# Patient Record
Sex: MALE | Race: WHITE | URBAN - METROPOLITAN AREA
[De-identification: names, ages, dates, MRNs, and addresses within clinical notes are randomized per-mention and may not be internally consistent; named-entity substitution may affect disease eponyms.]

---

## 2021-08-11 ENCOUNTER — HOSPITAL ENCOUNTER (OUTPATIENT)
Dept: RADIATION THERAPY | Age: 61
Discharge: HOME | End: 2021-08-11

## 2021-08-11 VITALS — HEIGHT: 70 IN | BODY MASS INDEX: 27.63 KG/M2 | WEIGHT: 193 LBS

## 2021-08-11 RX ORDER — VALSARTAN AND HYDROCHLOROTHIAZIDE 160; 12.5 MG/1; MG/1
1 TABLET, FILM COATED ORAL DAILY
COMMUNITY

## 2023-05-15 RX ORDER — VALSARTAN AND HYDROCHLOROTHIAZIDE 160; 12.5 MG/1; MG/1
1 TABLET, FILM COATED ORAL DAILY
COMMUNITY

## 2024-06-18 ENCOUNTER — HOSPITAL ENCOUNTER (OUTPATIENT)
Facility: HOSPITAL | Age: 64
Setting detail: RECURRING SERIES
Discharge: HOME | End: 2024-06-21
Payer: COMMERCIAL

## 2024-06-18 PROCEDURE — 97161 PT EVAL LOW COMPLEX 20 MIN: CPT

## 2024-06-18 PROCEDURE — 97110 THERAPEUTIC EXERCISES: CPT

## 2024-06-18 NOTE — THERAPY EVALUATION
while the patient is under my care. I agree with the treatment plan and certify that this therapy is necessary.    Physician's Signature:_________________________   DATE:_________   TIME:________                           Lencho Lane MD    ** Signature, Date and Time must be completed for valid certification **  Please sign and fax to [phone].  Thank you      Patient Name:  Joce Wen :  1960

## 2024-06-21 ENCOUNTER — HOSPITAL ENCOUNTER (OUTPATIENT)
Facility: HOSPITAL | Age: 64
Setting detail: RECURRING SERIES
End: 2024-06-21
Payer: COMMERCIAL

## 2024-06-24 ENCOUNTER — HOSPITAL ENCOUNTER (OUTPATIENT)
Facility: HOSPITAL | Age: 64
Setting detail: RECURRING SERIES
Discharge: HOME | End: 2024-06-27
Payer: COMMERCIAL

## 2024-06-24 PROCEDURE — 97110 THERAPEUTIC EXERCISES: CPT

## 2024-06-24 PROCEDURE — 97016 VASOPNEUMATIC DEVICE THERAPY: CPT

## 2024-06-26 ENCOUNTER — HOSPITAL ENCOUNTER (OUTPATIENT)
Facility: HOSPITAL | Age: 64
Setting detail: RECURRING SERIES
Discharge: HOME | End: 2024-06-29
Payer: COMMERCIAL

## 2024-06-26 PROCEDURE — 97016 VASOPNEUMATIC DEVICE THERAPY: CPT

## 2024-06-26 PROCEDURE — 97110 THERAPEUTIC EXERCISES: CPT

## 2024-06-26 NOTE — PROGRESS NOTES
PHYSICAL THERAPY - DAILY TREATMENT NOTE (updated 3/23)    Date of Service: 2024        Patient Name:  Joce Wen :  1960   Medical   Diagnosis:  Primary osteoarthritis, right shoulder [M19.011] Treatment Diagnosis:  M25.511  RIGHT SHOULDER PAIN and M79.621  Pain in right upper arm    Referral Source:  Lencho Lane MD Insurance:   Payor: [facility] / Plan: [facility] / Product Type: *No Product type* /     [x] Patient's date of birth verified                Visit #   Current  / Total 2 10   Time   In / Out 1119 1207   Total Treatment Time (in minutes) 48    Total Timed Codes  (in minutes) 38    [facility] Totals Reminder:    8-22 min = 1 unit; 23-37 min = 2 units; 38-52 min = 3 units;  53-67 min = 4 units; 68-82 min = 5 units    SUBJECTIVE  Pain Level (0-10 scale): 0/10    Any medication changes, allergies to medications, adverse drug reactions, diagnosis change, or new procedure performed?: No  Medications: [x]  Verified on Patient Summary List    Subjective functional status/changes:     \"It's sore from my last visit.\"    OBJECTIVE  Therapeutic Procedures:  Tx Min Billable or 1:1 Min (if diff from Tx Min) Procedure, Rationale, Specifics   38  [id number] Therapeutic Exercise (timed):  increase ROM, strength, coordination, balance, and proprioception to improve patient's ability to progress to PLOF and address remaining functional goals. (see flow sheet as applicable)   38     Total Total   [x] Patient Education billed concurrently with other procedures    Modalities Rationale:     decrease edema, decrease inflammation, and decrease pain to improve patient's ability to progress to PLOF and address remaining functional goals.       min [] Estim Unattended,             []  NMES  [] PreMod       []  IFC  [] Other:  []w/US   []w/ice   []w/heat  Position:  Location:            min []  Mechanical Traction,        []  Cervical      []  Lumbar        []  Prone         []  Supine           []  Intermitt.     []  Cont.

## 2024-07-01 ENCOUNTER — HOSPITAL ENCOUNTER (OUTPATIENT)
Facility: HOSPITAL | Age: 64
Setting detail: RECURRING SERIES
Discharge: HOME | End: 2024-07-04
Payer: COMMERCIAL

## 2024-07-01 PROCEDURE — 97016 VASOPNEUMATIC DEVICE THERAPY: CPT

## 2024-07-01 PROCEDURE — 97110 THERAPEUTIC EXERCISES: CPT

## 2024-07-01 NOTE — PROGRESS NOTES
PHYSICAL THERAPY - DAILY TREATMENT NOTE (updated 3/23)    Date of Service: 2024        Patient Name:  Joce Wen :  1960   Medical   Diagnosis:  Primary osteoarthritis, right shoulder [M19.011] Treatment Diagnosis:  M25.511  RIGHT SHOULDER PAIN    Referral Source:  Lencho Lane MD Insurance:   Payor: [facility] / Plan: [facility] / Product Type: *No Product type* /     [x] Patient's date of birth verified                Visit #   Current  / Total 4 10   Time   In / Out 1518 1612   Total Treatment Time (in minutes) 54    Total Timed Codes  (in minutes) 44    [facility] Totals Reminder:    8-22 min = 1 unit; 23-37 min = 2 units; 38-52 min = 3 units;  53-67 min = 4 units; 68-82 min = 5 units      SUBJECTIVE  Pain Level (0-10 scale): 0/10-    Any medication changes, allergies to medications, adverse drug reactions, diagnosis change, or new procedure performed?: No  Medications: [x]  Verified on Patient Summary List    Subjective functional status/changes:     \"Pt reports he had some soreness in the shoulder last night and it hurt for a day or so after last visit, better today.\"    OBJECTIVE  Therapeutic Procedures:  Tx Min Billable or 1:1 Min (if diff from Tx Min) Procedure, Rationale, Specifics   44 44 [id number] Therapeutic Exercise (timed):  increase ROM, strength, coordination, balance, and proprioception to improve patient's ability to progress to PLOF and address remaining functional goals. (see flow sheet as applicable)   44 44    Total Total   [x] Patient Education billed concurrently with other procedures    Modalities Rationale:     decrease edema, decrease inflammation, decrease pain, and increase tissue extensibility to improve patient's ability to progress to PLOF and address remaining functional goals.       min [] Estim Unattended,             []  NMES  [] PreMod       []  IFC  [] Other:  []w/US   []w/ice   []w/heat  Position:  Location:            min []  Mechanical Traction,        []  Cervical

## 2024-07-03 ENCOUNTER — APPOINTMENT (OUTPATIENT)
Facility: HOSPITAL | Age: 64
End: 2024-07-03
Payer: COMMERCIAL

## 2024-07-05 ENCOUNTER — HOSPITAL ENCOUNTER (OUTPATIENT)
Facility: HOSPITAL | Age: 64
Setting detail: RECURRING SERIES
Discharge: HOME | End: 2024-07-08
Payer: COMMERCIAL

## 2024-07-05 PROCEDURE — 97016 VASOPNEUMATIC DEVICE THERAPY: CPT

## 2024-07-05 PROCEDURE — 97110 THERAPEUTIC EXERCISES: CPT

## 2024-07-05 NOTE — PROGRESS NOTES
Elbow/wrist arom      X 20  Bicep curls  x20     (3 way)   Bicep curls  X2/10   (3 way)   Bicep curls  X2/10                 Scap retract            x20    X 20 / 5\" hold  X 20 / 5\" hold                pulleys  flex            x3'    X 2 '  X 3 '                  PROM      supine  X 10 '    x8'  X 10'  X 10'                                                                                                         Modalities to be included future treatment sessions: Vasopneumatic Compression and Electrical Stimulation  Has HEP been provided to patient? [] No    [x] Yes        Access Code: [email or web address]      Date Provided: 6/18/2024  [] Reviewed HEP    [] Progressed/Changed HEP, details:      GOALS  Short Term Goals, to be met within 5 treatments:  Patient will demonstrate independence and compliance with HEP in order to increase mobility and assist with carryover from PT services.  Status at last Eval/Progress Note: provided  Current Status: provided  Goal Met?  No     2.  Patient will be able to reach overhead into cabinet without pain greater than or equal to 3/10 to increase functional mobility.  Status at last Eval/Progress Note: unable  Current Status: unable  Goal Met?  No     3. Patient will increase right shoulder arom flexion to 110 degrees in order to improve function.  Status at last Eval/Progress Note: 95 degrees  Current Status: 95 degrees  Goal Met?  No     Long Term Goals, to be met within 10 treatments:  1.Patient will be able to return to local gym without difficulty to increase functional mobility.  Status at last Eval/Progress Note: unable  Current Status: unable  Goal Met?  No     2.  Patient will increase right shoulder abduction arom to 115 degrees in order to improve function.  Status at last Eval/Progress Note: 86 degrees  Current Status: 86 degrees  Goal Met?  No     3. Patient will be able to complete all ADLs without pain greater than or equal to 1/10 to increase functional mobility.  Status at

## 2024-07-08 ENCOUNTER — HOSPITAL ENCOUNTER (OUTPATIENT)
Facility: HOSPITAL | Age: 64
Setting detail: RECURRING SERIES
Discharge: HOME | End: 2024-07-11
Payer: COMMERCIAL

## 2024-07-08 PROCEDURE — 97140 MANUAL THERAPY 1/> REGIONS: CPT

## 2024-07-08 PROCEDURE — 97110 THERAPEUTIC EXERCISES: CPT

## 2024-07-08 PROCEDURE — 97016 VASOPNEUMATIC DEVICE THERAPY: CPT

## 2024-07-08 NOTE — PROGRESS NOTES
PHYSICAL THERAPY - DAILY TREATMENT NOTE (updated 3/23)    Date of Service: 2024        Patient Name:  Joce Wen :  1960   Medical   Diagnosis:  Primary osteoarthritis, right shoulder [M19.011] Treatment Diagnosis:  M25.511  RIGHT SHOULDER PAIN    Referral Source:  Lencho Lane MD Insurance:   Payor: [facility] / Plan: [facility] / Product Type: *No Product type* /     [x] Patient's date of birth verified                Visit #   Current  / Total 6 10   Time   In / Out 15:30 16;25   Total Treatment Time (in minutes) 55    Total Timed Codes  (in minutes) 45     [facility] Totals Reminder:    8-22 min = 1 unit; 23-37 min = 2 units; 38-52 min = 3 units;  53-67 min = 4 units; 68-82 min = 5 units      SUBJECTIVE  Pain Level (0-10 scale): 1/10    Any medication changes, allergies to medications, adverse drug reactions, diagnosis change, or new procedure performed?: No  Medications: [x]  Verified on Patient Summary List    Subjective functional status/changes:     \"I am a little sore in my forearm..\"    OBJECTIVE  Therapeutic Procedures:  Tx Min Billable or 1:1 Min (if diff from Tx Min) Procedure, Rationale, Specifics   33 33 44534 Therapeutic Exercise (timed):  increase ROM, strength, coordination, balance, and proprioception to improve patient's ability to progress to PLOF and address remaining functional goals. (see flow sheet as applicable)   12 12 64627 Manual Therapy (timed):  decrease pain, increase ROM, and increase tissue extensibility to improve patient's ability to progress to PLOF and address remaining functional goals.  The manual therapy interventions were performed at a separate and distinct time from the therapeutic activities interventions . (see flow sheet as applicable)             45 45    Total Total   [x] Patient Education billed concurrently with other procedures    Modalities Rationale:     decrease edema, decrease inflammation, and decrease pain to improve patient's ability to progress to

## 2024-07-10 ENCOUNTER — HOSPITAL ENCOUNTER (OUTPATIENT)
Facility: HOSPITAL | Age: 64
Setting detail: RECURRING SERIES
Discharge: HOME | End: 2024-07-13
Payer: COMMERCIAL

## 2024-07-10 PROCEDURE — 97016 VASOPNEUMATIC DEVICE THERAPY: CPT

## 2024-07-10 PROCEDURE — 97110 THERAPEUTIC EXERCISES: CPT

## 2024-07-10 PROCEDURE — 97140 MANUAL THERAPY 1/> REGIONS: CPT

## 2024-07-10 NOTE — PROGRESS NOTES
PHYSICAL THERAPY - DAILY TREATMENT NOTE (updated 3/23)    Date of Service: 7/10/2024        Patient Name:  Joce Wen :  1960   Medical   Diagnosis:  Primary osteoarthritis, right shoulder [M19.011] Treatment Diagnosis:  M25.511  RIGHT SHOULDER PAIN    Referral Source:  Lencho Lane MD Insurance:   Payor: [facility] / Plan: [facility] / Product Type: *No Product type* /     [x] Patient's date of birth verified                Visit #   Current  / Total 7 10   Time   In / Out 15:30 16:30   Total Treatment Time (in minutes) 60    Total Timed Codes  (in minutes) 50    [facility] Totals Reminder:    8-22 min = 1 unit; 23-37 min = 2 units; 38-52 min = 3 units;  53-67 min = 4 units; 68-82 min = 5 units      SUBJECTIVE  Pain Level (0-10 scale): 1/10    Any medication changes, allergies to medications, adverse drug reactions, diagnosis change, or new procedure performed?: No  Medications: [x]  Verified on Patient Summary List    Subjective functional status/changes:     \"I'm just a little sore. The forearm numbness is getting better and the thumb is starting to feel normal with very little tingling    OBJECTIVE  Therapeutic Procedures:  Tx Min Billable or 1:1 Min (if diff from Tx Min) Procedure, Rationale, Specifics   36 36 [id number] Therapeutic Exercise (timed):  increase ROM, strength, coordination, balance, and proprioception to improve patient's ability to progress to PLOF and address remaining functional goals. (see flow sheet as applicable)   14 14 36968 Manual Therapy (timed):  decrease pain, increase ROM, and increase tissue extensibility to improve patient's ability to progress to PLOF and address remaining functional goals.  The manual therapy interventions were performed at a separate and distinct time from the therapeutic activities interventions . (see flow sheet as applicable)             50 50    Total Total   [x] Patient Education billed concurrently with other procedures    Modalities Rationale:

## 2024-07-15 ENCOUNTER — HOSPITAL ENCOUNTER (OUTPATIENT)
Facility: HOSPITAL | Age: 64
Setting detail: RECURRING SERIES
Discharge: HOME | End: 2024-07-18
Payer: COMMERCIAL

## 2024-07-15 PROCEDURE — 97016 VASOPNEUMATIC DEVICE THERAPY: CPT

## 2024-07-15 PROCEDURE — 97110 THERAPEUTIC EXERCISES: CPT

## 2024-07-15 NOTE — PROGRESS NOTES
right shoulder abduction arom to 115 degrees in order to improve function.  Status at last Eval/Progress Note: 86 degrees  Current Status: 86 degrees  Goal Met?  No     3. Patient will be able to complete all ADLs without pain greater than or equal to 1/10 to increase functional mobility.  Status at last Eval/Progress Note: limited  Current Status: limited  Goal Met?  No     4. Patient will be able to return to all work duties without pain greater than or equal to 1/10 to increase functional mobility.  Status at last Eval/Progress Note: unable  Current Status: unable  Goal Met?  No  []  See Progress Note/Recertification  []  See Discharge Summary    PLAN  [x]  Continue plan of care  [x]  Upgrade activities as tolerated  []  Discharge due to :  []  Other:      Matt Masters, PT, DPT       7/15/2024       3:40 PM

## 2024-07-17 ENCOUNTER — HOSPITAL ENCOUNTER (OUTPATIENT)
Facility: HOSPITAL | Age: 64
Setting detail: RECURRING SERIES
Discharge: HOME | End: 2024-07-20
Payer: COMMERCIAL

## 2024-07-17 PROCEDURE — 97016 VASOPNEUMATIC DEVICE THERAPY: CPT

## 2024-07-17 PROCEDURE — 97110 THERAPEUTIC EXERCISES: CPT

## 2024-07-17 NOTE — PROGRESS NOTES
PHYSICAL THERAPY - DAILY TREATMENT NOTE (updated 3/23)    Date of Service: 2024        Patient Name:  Joce Wen :  1960   Medical   Diagnosis:  Primary osteoarthritis, right shoulder [M19.011] Treatment Diagnosis:  M25.511  RIGHT SHOULDER PAIN    Referral Source:  Lencho Lane MD Insurance:   Payor: [facility] / [facility] / Product Type: *No Product type* /     [x] Patient's date of birth verified                Visit #   Current  / Total 9 10   Time   In / Out 15:25 16:27   Total Treatment Time (in minutes) 62    Total Timed Codes  (in minutes) 52    [facility] Totals Reminder:    8-22 min = 1 unit; 23-37 min = 2 units; 38-52 min = 3 units;  53-67 min = 4 units; 68-82 min = 5 units      SUBJECTIVE  Pain Level (0-10 scale): 0/10    Any medication changes, allergies to medications, adverse drug reactions, diagnosis change, or new procedure performed?: No  Medications: [x]  Verified on Patient Summary List    Subjective functional status/changes:     \"I'm doing a lot better.I have an ortho f/u appointment tomorrow.  I was not sore after my last session\"    OBJECTIVE  Therapeutic Procedures:  Tx Min Billable or 1:1 Min (if diff from Tx Min) Procedure, Rationale, Specifics   52 52 40845 Therapeutic Exercise (timed):  increase ROM, strength, coordination, balance, and proprioception to improve patient's ability to progress to PLOF and address remaining functional goals. (see flow sheet as applicable)                  52 52    Total Total   [x] Patient Education billed concurrently with other procedures    Modalities Rationale:     decrease edema, decrease inflammation, and decrease pain to improve patient's ability to progress to PLOF and address remaining functional goals.       min [] Estim Unattended,             []  NMES  [] PreMod       []  IFC  [] Other:  []w/US   []w/ice   []w/heat  Position:  Location:            min []  Mechanical Traction,        []  Cervical      []  Lumbar        []  Prone

## 2024-07-22 ENCOUNTER — HOSPITAL ENCOUNTER (OUTPATIENT)
Facility: HOSPITAL | Age: 64
Setting detail: RECURRING SERIES
Discharge: HOME | End: 2024-07-25
Payer: COMMERCIAL

## 2024-07-22 PROCEDURE — 97016 VASOPNEUMATIC DEVICE THERAPY: CPT

## 2024-07-22 PROCEDURE — 97110 THERAPEUTIC EXERCISES: CPT

## 2024-07-22 NOTE — PROGRESS NOTES
[facility]  [address] [place] [id number]  Ph: [phone]     Fax: [phone]    PHYSICAL THERAPY PROGRESS NOTE  Patient Name:  Joce Wen :  1960   Treatment/Medical Diagnosis: Primary osteoarthritis, right shoulder [M19.011]   Referral Source:  Lencho Lane MD     Date of Initial Visit:  2024 Attended Visits:  10 Missed Visits:  2       Summary of Care / Assessment / Recommendations:   Patient presented to physical therapy with right shoulder pain, weakness, postural instability and decreased range of motion limiting all functional activities post R TSA 2024. Pt has received treatment including range of motion, flexibility, strengthening, modalities for pain control, glenohumeral and scapulothoracic stabilization, and functional activities.  Pt has demonstrated overall progress in rom, pain level, and function.  Pt continues to present with functional deficits including arom, prom, pain level, strength, endurance, and overall function. Pt would benefit from further skilled physical therapy interventions to continue to progress range of motion, strength, and balance, allowing for improved function.        Progress Toward Goals:   Short Term Goals, to be met within 5 treatments:  Patient will demonstrate independence and compliance with HEP in order to increase mobility and assist with carryover from PT services.  Status at last Eval/Progress Note: provided  Current Status: compliant  Goal Met?  Yes     2.  Patient will be able to reach overhead into cabinet without pain greater than or equal to 3/10 to increase functional mobility.  Status at last Eval/Progress Note: unable  Current Status: limited  Goal Met?  progressing     3. Patient will increase right shoulder arom flexion to 110 degrees in order to improve function.  Status at last Eval/Progress Note: 95 degrees PROM  Current Status: 90 degrees AROM  Goal Met?  No     Long Term 
                      Therapeutic Procedures:  Tx Min Billable or 1:1 Min (if diff from Tx Min) Procedure, Rationale, Specifics   46  [id number] Therapeutic Exercise (timed):  increase ROM, strength, coordination, balance, and proprioception to improve patient's ability to progress to PLOF and address remaining functional goals. (see flow sheet as applicable)                  46     Total Total   [x] Patient Education billed concurrently with other procedures    Modalities Rationale:     decrease edema, decrease inflammation, and decrease pain to improve patient's ability to progress to PLOF and address remaining functional goals.       min [] Estim Unattended,             []  NMES  [] PreMod       []  IFC  [] Other:  []w/US   []w/ice   []w/heat  Position:  Location:            min []  Mechanical Traction,        []  Cervical      []  Lumbar        []  Prone         []  Supine           []  Intermitt.     []  Cont.     Lbs:    pounds  [] With heat  [] Simultaneously performed with E-Stim    min []  Ultrasound,    []Continuous   [] Pulsed  []1MHz   []3MHz Location:  W/cm2:    min  Unbilled []  Ice     []  Heat             Position:  Location:       10     min [x]  Vasopneumatic Device,Rt. shldr    pre-treatment girth : 43.5 cm   post-treatment girth : 43.5 cm   measured at (landmark       location) :  Pressure:       [x] lo [] med [] hi   Temperature: [x] lo [] med [] Hi    [x] With ice 34F   [] Simultaneously performed with Estim     Skin assessment post-treatment (if applicable):   [x]  intact    [x]  redness- no adverse reaction []redness - adverse reaction:        Pain Level at end of session (0-10 scale): 0/10    Assessment   Patient presented to physical therapy with right shoulder pain, weakness, postural instability and decreased range of motion limiting all functional activities post R TSA 6/5/2024. Pt has received treatment including range of motion, flexibility, strengthening, modalities for pain control,

## 2024-07-25 ENCOUNTER — HOSPITAL ENCOUNTER (OUTPATIENT)
Facility: HOSPITAL | Age: 64
Setting detail: RECURRING SERIES
Discharge: HOME | End: 2024-07-28
Payer: COMMERCIAL

## 2024-07-25 PROCEDURE — 97016 VASOPNEUMATIC DEVICE THERAPY: CPT

## 2024-07-25 PROCEDURE — 97110 THERAPEUTIC EXERCISES: CPT

## 2024-07-25 NOTE — PROGRESS NOTES
PHYSICAL THERAPY - DAILY TREATMENT NOTE (updated 3/23)    Date of Service: 2024        Patient Name:  Joce Wen :  1960   Medical   Diagnosis:  Primary osteoarthritis, right shoulder [M19.011] Treatment Diagnosis:  M25.511  RIGHT SHOULDER PAIN    Referral Source:  Lencho Lane MD Insurance:   Payor: [facility] / Plan: [facility] / Product Type: *No Product type* /     [x] Patient's date of birth verified                Visit #   Current  / Total 11 20   Time   In / Out 14:00 15:07   Total Treatment Time (in minutes) 67    Total Timed Codes  (in minutes) 57    [facility] Totals Reminder:    8-22 min = 1 unit; 23-37 min = 2 units; 38-52 min = 3 units;  53-67 min = 4 units; 68-82 min = 5 units      SUBJECTIVE  Pain Level (0-10 scale): 1/10    Any medication changes, allergies to medications, adverse drug reactions, diagnosis change, or new procedure performed?: No  Medications: [x]  Verified on Patient Summary List    Subjective functional status/changes:     \"I'm  just hurting a little . Yesterday was the first day it really bothered me. I had occasional shooting pain in the front of the joint. \"    OBJECTIVE  Therapeutic Procedures:  Tx Min Billable or 1:1 Min (if diff from Tx Min) Procedure, Rationale, Specifics   57 20 32864 Therapeutic Exercise (timed):  increase ROM, strength, coordination, balance, and proprioception to improve patient's ability to progress to PLOF and address remaining functional goals. (see flow sheet as applicable)                  57 57    Total Total   [x] Patient Education billed concurrently with other procedures    Modalities Rationale:     decrease edema, decrease inflammation, decrease pain, and increase tissue extensibility to improve patient's ability to progress to PLOF and address remaining functional goals.       min [] Estim Unattended,             []  NMES  [] PreMod       []  IFC  [] Other:  []w/US   []w/ice   []w/heat  Position:  Location:            min []

## 2024-07-29 ENCOUNTER — APPOINTMENT (OUTPATIENT)
Facility: HOSPITAL | Age: 64
End: 2024-07-29
Payer: COMMERCIAL

## 2024-08-01 ENCOUNTER — HOSPITAL ENCOUNTER (OUTPATIENT)
Facility: HOSPITAL | Age: 64
Setting detail: RECURRING SERIES
Discharge: HOME | End: 2024-08-04
Payer: COMMERCIAL

## 2024-08-01 PROCEDURE — 97016 VASOPNEUMATIC DEVICE THERAPY: CPT

## 2024-08-01 PROCEDURE — 97110 THERAPEUTIC EXERCISES: CPT

## 2024-08-01 NOTE — PROGRESS NOTES
PHYSICAL THERAPY - DAILY TREATMENT NOTE (updated 3/23)    Date of Service: 2024        Patient Name:  Joce Wen :  1960   Medical   Diagnosis:  Primary osteoarthritis, right shoulder [M19.011] Treatment Diagnosis:  M25.511  RIGHT SHOULDER PAIN    Referral Source:  Lencho Lane MD Insurance:   [facility]or: [facility] / Product Type: *No Product type* /     [x] Patient's date of birth verified                Visit #   Current  / Total 12 20   Time   In / Out 1400 1500   Total Treatment Time (in minutes) 60    Total Timed Codes  (in minutes) 50    [facility] Totals Reminder:    8-22 min = 1 unit; 23-37 min = 2 units; 38-52 min = 3 units;  53-67 min = 4 units; 68-82 min = 5 units      SUBJECTIVE  Pain Level (0-10 scale): 1/10    Any medication changes, allergies to medications, adverse drug reactions, diagnosis change, or new procedure performed?: No  Medications: [x]  Verified on Patient Summary List    Subjective functional status/changes:     \"Pt reports he knows his limitations and is doing more as much as he can.\"    OBJECTIVE  Therapeutic Procedures:  Tx Min Billable or 1:1 Min (if diff from Tx Min) Procedure, Rationale, Specifics   50 50 [id number] Therapeutic Exercise (timed):  increase ROM, strength, coordination, balance, and proprioception to improve patient's ability to progress to PLOF and address remaining functional goals. (see flow sheet as applicable)   50 50    Total Total   [x] Patient Education billed concurrently with other procedures    Modalities Rationale:     decrease edema, decrease inflammation, decrease pain, and increase tissue extensibility to improve patient's ability to progress to PLOF and address remaining functional goals.       min [] Estim Unattended,             []  NMES  [] PreMod       []  IFC  [] Other:  []w/US   []w/ice   []w/heat  Position:  Location:            min []  Mechanical Traction,        []  Cervical      []  Lumbar        []  Prone         []

## 2024-08-05 ENCOUNTER — APPOINTMENT (OUTPATIENT)
Facility: HOSPITAL | Age: 64
End: 2024-08-05
Payer: COMMERCIAL

## 2024-08-06 ENCOUNTER — HOSPITAL ENCOUNTER (OUTPATIENT)
Facility: HOSPITAL | Age: 64
Setting detail: RECURRING SERIES
Discharge: HOME | End: 2024-08-09
Payer: COMMERCIAL

## 2024-08-06 PROCEDURE — 97110 THERAPEUTIC EXERCISES: CPT

## 2024-08-06 PROCEDURE — 97016 VASOPNEUMATIC DEVICE THERAPY: CPT

## 2024-08-06 NOTE — PROGRESS NOTES
PHYSICAL THERAPY - DAILY TREATMENT NOTE (updated 3/23)    Date of Service: 2024        Patient Name:  Joce Wen :  1960   Medical   Diagnosis:  Primary osteoarthritis, right shoulder [M19.011] Treatment Diagnosis:  M25.511  RIGHT SHOULDER PAIN    Referral Source:  Lencho Lane MD Insurance:   [facility]or: [facility] / [facility] / Product Type: *No Product type* /     [x] Patient's date of birth verified                Visit #   Current  / Total 13 20   Time   In / Out 1340 1443   Total Treatment Time (in minutes) 63    Total Timed Codes  (in minutes) 53   [facility] Totals Reminder:    8-22 min = 1 unit; 23-37 min = 2 units; 38-52 min = 3 units;  53-67 min = 4 units; 68-82 min = 5 units      SUBJECTIVE  Pain Level (0-10 scale): 0/10    Any medication changes, allergies to medications, adverse drug reactions, diagnosis change, or new procedure performed?: No  Medications: [x]  Verified on Patient Summary List    Subjective functional status/changes:     \"Pt reports that he is still having some limitations however no pain.\"    OBJECTIVE  Therapeutic Procedures:  Tx Min Billable or 1:1 Min (if diff from Tx Min) Procedure, Rationale, Specifics   53 53 [id number] Therapeutic Exercise (timed):  increase ROM, strength, coordination, balance, and proprioception to improve patient's ability to progress to PLOF and address remaining functional goals. (see flow sheet as applicable)   53 53    Total Total   [x] Patient Education billed concurrently with other procedures    Modalities Rationale:     decrease edema, decrease inflammation, decrease pain, and increase tissue extensibility to improve patient's ability to progress to PLOF and address remaining functional goals.       min [] Estim Unattended,             []  NMES  [] PreMod       []  IFC  [] Other:  []w/US   []w/ice   []w/heat  Position:  Location:            min []  Mechanical Traction,        []  Cervical      []  Lumbar        []  Prone         []  Supine

## 2024-08-08 ENCOUNTER — HOSPITAL ENCOUNTER (OUTPATIENT)
Facility: HOSPITAL | Age: 64
Setting detail: RECURRING SERIES
Discharge: HOME | End: 2024-08-11
Payer: COMMERCIAL

## 2024-08-08 PROCEDURE — 97016 VASOPNEUMATIC DEVICE THERAPY: CPT

## 2024-08-08 PROCEDURE — 97110 THERAPEUTIC EXERCISES: CPT

## 2024-08-08 NOTE — PROGRESS NOTES
RTB  X 30/3\"  RTB  X 30/3\"     RTB x 20                   T-band ext RTB  X 30/3\"  RTB  X 30/3\"  RTB  X 30/3\"    RTB x 20                  Elbow/wrist arom    ---    --                      Scap retract    ---    --                    pulleys  flex Flex/Abd  4' Flex/HAB  /Abd  3'  HAB  /Abd  3'   Flex/HAB  /Abd  3'                    PROM    ---    --                      T-band IR/ER     X 20 Ea.  RTB X 20 Ea. RTB X 20 Ea.                     Wall Ladder Flex/Abd  X 5      --                 Isometric   Flex,Ext., Abd, E.R & IR   3/5\" each Flex,Ext., Abd, E.R & IR   5/10\" ea   Flex,Ext., Abd, E.R & IR   10/10\" ea                  Wall Slides Flex/Abd  X 5Ea. Flex/Abd  X 10 ea.  Flex/Abd  X 10 ea.                  wand Flex/Abd  X 20 Supine blue x 20 + ER     --  Standing brown x 20   Flex/abd/IR               Ball On Wall     Cw/ccw  X 30 each  Cw/ccw  X 30 each  Soft ball                Cones on shelf Flex/Abd2nd shelf  3' Ea Flex/Abd2nd shelf  3' Ea  Flex/ top cones 2nd shelf 1#  Abd 2nd -cones  3' Ea   Flex/abd 1st shelf x 3' 1& 2 lbs                   Modalities to be included future treatment sessions: Vasopneumatic Compression and Electrical Stimulation  Has HEP been provided to patient? [] No    [x] Yes        Access Code: [email or web address]      Date Provided: 6/18/2024  [] Reviewed HEP    [] Progressed/Changed HEP, details:      GOALS    Short Term Goals, to be met within 5 treatments:  Patient will demonstrate independence and compliance with HEP in order to increase mobility and assist with carryover from PT services.  Status at last Eval/Progress Note: provided  Current Status: compliant  Goal Met?  Yes     2.  Patient will be able to reach overhead into cabinet without pain greater than or equal to 3/10 to increase functional mobility.  Status at last Eval/Progress Note: unable  Current Status: limited  Goal Met?  progressing     3. Patient will increase right shoulder arom flexion to 110 degrees in order to

## 2024-08-13 ENCOUNTER — HOSPITAL ENCOUNTER (OUTPATIENT)
Facility: HOSPITAL | Age: 64
Setting detail: RECURRING SERIES
Discharge: HOME | End: 2024-08-16
Payer: COMMERCIAL

## 2024-08-13 PROCEDURE — 97016 VASOPNEUMATIC DEVICE THERAPY: CPT

## 2024-08-13 PROCEDURE — 97110 THERAPEUTIC EXERCISES: CPT

## 2024-08-13 NOTE — PROGRESS NOTES
PHYSICAL THERAPY - DAILY TREATMENT NOTE (updated 3/23)    Date of Service: 2024        Patient Name:  Joce Wen :  1960   Medical   Diagnosis:  Primary osteoarthritis, right shoulder [M19.011] Treatment Diagnosis:  M25.511  RIGHT SHOULDER PAIN    Referral Source:  Lencho Lane MD Insurance:   [facility] Product Type: *No Product type* /     [x] Patient's date of birth verified                Visit #   Current  / Total 15 20   Time   In / Out 1330 1436   Total Treatment Time (in minutes) 66    Total Timed Codes  (in minutes) 56    [facility] Totals Reminder:    8-22 min = 1 unit; 23-37 min = 2 units; 38-52 min = 3 units;  53-67 min = 4 units; 68-82 min = 5 units    SUBJECTIVE  Pain Level (0-10 scale): 1/10    Any medication changes, allergies to medications, adverse drug reactions, diagnosis change, or new procedure performed?: No  Medications: [x]  Verified on Patient Summary List    Subjective functional status/changes:     Pt states he is having numbness in his forearm, which has been bothering him the most.    OBJECTIVE  Therapeutic Procedures:  Tx Min Billable or 1:1 Min (if diff from Tx Min) Procedure, Rationale, Specifics   56  [id number] Therapeutic Exercise (timed):  increase ROM, strength, coordination, balance, and proprioception to improve patient's ability to progress to PLOF and address remaining functional goals. (see flow sheet as applicable)   56     Total Total   [x] Patient Education billed concurrently with other procedures    Modalities Rationale:     decrease edema, decrease inflammation, and decrease pain to improve patient's ability to progress to PLOF and address remaining functional goals.       min [] Estim Unattended,             []  NMES  [] PreMod       []  IFC  [] Other:  []w/US   []w/ice   []w/heat  Position:  Location:            min []  Mechanical Traction,        []  Cervical      []  Lumbar        []  Prone         []  Supine           []

## 2024-08-15 ENCOUNTER — APPOINTMENT (OUTPATIENT)
Facility: HOSPITAL | Age: 64
End: 2024-08-15
Payer: COMMERCIAL

## 2024-08-20 ENCOUNTER — HOSPITAL ENCOUNTER (OUTPATIENT)
Facility: HOSPITAL | Age: 64
Setting detail: RECURRING SERIES
Discharge: HOME | End: 2024-08-23
Payer: COMMERCIAL

## 2024-08-20 PROCEDURE — 97016 VASOPNEUMATIC DEVICE THERAPY: CPT

## 2024-08-20 PROCEDURE — 97110 THERAPEUTIC EXERCISES: CPT

## 2024-08-20 NOTE — PROGRESS NOTES
PHYSICAL THERAPY - DAILY TREATMENT NOTE (updated 3/23)    Date of Service: 2024        Patient Name:  Joce Wen :  1960   Medical   Diagnosis:  Primary osteoarthritis, right shoulder [M19.011] Treatment Diagnosis:  M25.511  RIGHT SHOULDER PAIN    Referral Source:  Lencho Lane MD Insurance:   [facility]or[facility] / Product Type: *No Product type* /     [x] Patient's date of birth verified                Visit #   Current  / Total 16 20   Time   In / Out 1358 1502   Total Treatment Time (in minutes) 64    Total Timed Codes  (in minutes) 54    [facility] Totals Reminder:    8-22 min = 1 unit; 23-37 min = 2 units; 38-52 min = 3 units;  53-67 min = 4 units; 68-82 min = 5 units      SUBJECTIVE  Pain Level (0-10 scale): 1/10    Any medication changes, allergies to medications, adverse drug reactions, diagnosis change, or new procedure performed?: No  Medications: [x]  Verified on Patient Summary List    Subjective functional status/changes:     \"Pt reports that he has been .\"    OBJECTIVE  Therapeutic Procedures:  Tx Min Billable or 1:1 Min (if diff from Tx Min) Procedure, Rationale, Specifics   54 54 [id number] Therapeutic Exercise (timed):  increase ROM, strength, coordination, balance, and proprioception to improve patient's ability to progress to PLOF and address remaining functional goals. (see flow sheet as applicable)   54 54    Total Total   [x] Patient Education billed concurrently with other procedures    Modalities Rationale:     decrease edema, decrease inflammation, decrease pain, and increase tissue extensibility to improve patient's ability to progress to PLOF and address remaining functional goals.       min [] Estim Unattended,             []  NMES  [] PreMod       []  IFC  [] Other:  []w/US   []w/ice   []w/heat  Position:  Location:            min []  Mechanical Traction,        []  Cervical      []  Lumbar        []  Prone         []  Supine           []  Intermitt.     []  Cont.

## 2024-08-22 ENCOUNTER — APPOINTMENT (OUTPATIENT)
Facility: HOSPITAL | Age: 64
End: 2024-08-22
Payer: COMMERCIAL

## 2024-08-23 ENCOUNTER — APPOINTMENT (OUTPATIENT)
Facility: HOSPITAL | Age: 64
End: 2024-08-23
Payer: COMMERCIAL

## 2024-08-26 ENCOUNTER — HOSPITAL ENCOUNTER (OUTPATIENT)
Facility: HOSPITAL | Age: 64
Setting detail: RECURRING SERIES
Discharge: HOME | End: 2024-08-29
Payer: COMMERCIAL

## 2024-08-26 PROCEDURE — 97110 THERAPEUTIC EXERCISES: CPT

## 2024-08-26 PROCEDURE — 97016 VASOPNEUMATIC DEVICE THERAPY: CPT

## 2024-08-26 NOTE — PROGRESS NOTES
PHYSICAL THERAPY - DAILY TREATMENT NOTE (updated 3/23)    Date of Service: 2024        Patient Name:  Joce Wen :  1960   Medical   Diagnosis:  Primary osteoarthritis, right shoulder [M19.011] Treatment Diagnosis:  M25.511  RIGHT SHOULDER PAIN    Referral Source:  Lencho Lane MD Insurance:   [facility]or: [facility] / [facility] / Product Type: *No Product type* /     [x] Patient's date of birth verified                Visit #   Current  / Total 17 20   Time   In / Out 1027 1128   Total Treatment Time (in minutes) 61    Total Timed Codes  (in minutes) 51    [facility] Totals Reminder:    8-22 min = 1 unit; 23-37 min = 2 units; 38-52 min = 3 units;  53-67 min = 4 units; 68-82 min = 5 units    SUBJECTIVE  Pain Level (0-10 scale): 1/10    Any medication changes, allergies to medications, adverse drug reactions, diagnosis change, or new procedure performed?: No  Medications: [x]  Verified on Patient Summary List    Subjective functional status/changes:     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    OBJECTIVE  Therapeutic Procedures:  Tx Min Billable or 1:1 Min (if diff from Tx Min) Procedure, Rationale, Specifics   51  13339 Therapeutic Exercise (timed):  increase ROM, strength, coordination, balance, and proprioception to improve patient's ability to progress to PLOF and address remaining functional goals. (see flow sheet as applicable)   51     Total Total   [x] Patient Education billed concurrently with other procedures    Modalities Rationale:     decrease edema, decrease inflammation, and decrease pain to improve patient's ability to progress to PLOF and address remaining functional goals.       min [] Estim Unattended,             []  NMES  [] PreMod       []  IFC  [] Other:  []w/US   []w/ice   []w/heat  Position:  Location:            min []  Mechanical Traction,        []  Cervical      []  Lumbar        []  Prone         []  Supine           []  Intermitt.

## 2024-08-29 ENCOUNTER — HOSPITAL ENCOUNTER (OUTPATIENT)
Facility: HOSPITAL | Age: 64
Setting detail: RECURRING SERIES
End: 2024-08-29
Payer: COMMERCIAL

## 2024-08-29 PROCEDURE — 97016 VASOPNEUMATIC DEVICE THERAPY: CPT

## 2024-08-29 PROCEDURE — 97110 THERAPEUTIC EXERCISES: CPT

## 2024-08-29 NOTE — PROGRESS NOTES
PHYSICAL THERAPY - DAILY TREATMENT NOTE (updated 3/23)    Date of Service: 2024        Patient Name:  Joce Wen :  1960   Medical   Diagnosis:  Primary osteoarthritis, right shoulder [M19.011] Treatment Diagnosis:  M25.511  RIGHT SHOULDER PAIN    Referral Source:  Lencho Lane MD Insurance:   [facility] / [facility] / Product Type: *No Product type* /     [x] Patient's date of birth verified                Visit #   Current  / Total 18 20   Time   In / Out 1400 1515   Total Treatment Time (in minutes) 75    Total Timed Codes  (in minutes) 65    [facility] Totals Reminder:    8-22 min = 1 unit; 23-37 min = 2 units; 38-52 min = 3 units;  53-67 min = 4 units; 68-82 min = 5 units    SUBJECTIVE  Pain Level (0-10 scale): 0/10    Any medication changes, allergies to medications, adverse drug reactions, diagnosis change, or new procedure performed?: No  Medications: [x]  Verified on Patient Summary List    Subjective functional status/changes:     \" I am doing good. I can sleep on it now. I just still have some numbness and feel like I am not getting my bicep back very well.\"     OBJECTIVE  Therapeutic Procedures:  Tx Min Billable or 1:1 Min (if diff from Tx Min) Procedure, Rationale, Specifics   65  24054 Therapeutic Exercise (timed):  increase ROM, strength, coordination, balance, and proprioception to improve patient's ability to progress to PLOF and address remaining functional goals. (see flow sheet as applicable)   65     Total Total   [x] Patient Education billed concurrently with other procedures    Modalities Rationale:     decrease edema, decrease inflammation, and decrease pain to improve patient's ability to progress to PLOF and address remaining functional goals.       min [] Estim Unattended,             []  NMES  [] PreMod       []  IFC  [] Other:  []w/US   []w/ice   []w/heat  Position:  Location:            min []  Mechanical Traction,        []  Cervical      []  Lumbar        []   Prone         []  Supine           []  Intermitt.     []  Cont.     Lbs:    pounds  [] With heat  [] Simultaneously performed with E-Stim    min []  Ultrasound,    []Continuous   [] Pulsed  []1MHz   []3MHz Location:  W/cm2:    min  Unbilled []  Ice     []  Heat             Position:  Location:       10     min [x]  Vasopneumatic Device to R shoulder.      Pressure:       [x] lo [] med [] hi   Temperature: [x] lo [] med [] Hi    [x] With ice    [] Simultaneously performed with Estim     Skin assessment post-treatment (if applicable):   [x]  intact    [x]  redness- no adverse reaction []redness - adverse reaction:        Pain Level at end of session (0-10 scale): 0/10    Assessment   Patient tolerated treatment well today. He is most concerned about his bicep strength lacking in biceps. We did some tricep and bicep work today to continue strengthening around the shoulder. He tends to compensate over 90 deg still with lack of end range humeral movements. Patient will continue to benefit from skilled PT services to modify and progress therapeutic interventions, analyze and address functional mobility deficits, analyze and address ROM deficits, and analyze and address strength deficits to address functional deficits and attain remaining goals.    PRECAUTIONS:  None at this time        Date of Initial Evaluation: 6/18/2024  Date of last Progress Note: n/a  Visit # of last Progress Note: 1   DOS: 6/5/2024     Number of Insurance Authorized visits:  30         Therapeutic Exercise Flow Sheet:                                                                                    Running Visit #    EXERCISE   11    12   13   14   15   16   17   18   19   20      UBE L2  12'  F/B Lvl 2 x 5 mins bwd  Lvl 2 x 12 mins fwd/bwd   Lvl 2 x 12 min fwd/bwd   Lvl 2 x 12' fwd/bwd   Lvl 2 x 12' fwd/bwd    Lvl 2 x12'  Fwd/bkwd Lvl 3 x12 min           T-band scapular retractions    RTB  X 30/3\" RTB  X 30/3\"  RTB  X 30/3\"      RTB x 20

## 2024-09-03 ENCOUNTER — APPOINTMENT (OUTPATIENT)
Facility: HOSPITAL | Age: 64
End: 2024-09-03
Payer: COMMERCIAL

## 2024-09-05 ENCOUNTER — HOSPITAL ENCOUNTER (OUTPATIENT)
Facility: HOSPITAL | Age: 64
Setting detail: RECURRING SERIES
Discharge: HOME | End: 2024-09-08
Payer: COMMERCIAL

## 2024-09-05 PROCEDURE — 97016 VASOPNEUMATIC DEVICE THERAPY: CPT

## 2024-09-05 PROCEDURE — 97110 THERAPEUTIC EXERCISES: CPT

## 2024-09-10 ENCOUNTER — APPOINTMENT (OUTPATIENT)
Facility: HOSPITAL | Age: 64
End: 2024-09-10
Payer: COMMERCIAL

## 2024-09-12 ENCOUNTER — HOSPITAL ENCOUNTER (OUTPATIENT)
Facility: HOSPITAL | Age: 64
Setting detail: RECURRING SERIES
Discharge: HOME | End: 2024-09-15
Payer: COMMERCIAL

## 2024-09-12 PROCEDURE — 97110 THERAPEUTIC EXERCISES: CPT

## 2024-09-17 ENCOUNTER — APPOINTMENT (OUTPATIENT)
Facility: HOSPITAL | Age: 64
End: 2024-09-17
Payer: COMMERCIAL

## 2024-09-19 ENCOUNTER — APPOINTMENT (OUTPATIENT)
Facility: HOSPITAL | Age: 64
End: 2024-09-19
Payer: COMMERCIAL

## 2024-09-24 ENCOUNTER — APPOINTMENT (OUTPATIENT)
Facility: HOSPITAL | Age: 64
End: 2024-09-24
Payer: COMMERCIAL

## 2024-09-26 ENCOUNTER — APPOINTMENT (OUTPATIENT)
Facility: HOSPITAL | Age: 64
End: 2024-09-26
Payer: COMMERCIAL